# Patient Record
Sex: MALE | Race: WHITE | Employment: FULL TIME | URBAN - METROPOLITAN AREA
[De-identification: names, ages, dates, MRNs, and addresses within clinical notes are randomized per-mention and may not be internally consistent; named-entity substitution may affect disease eponyms.]

---

## 2017-10-27 ENCOUNTER — TELEPHONE (OUTPATIENT)
Dept: INFECTIOUS DISEASES | Age: 72
End: 2017-10-27

## 2017-10-27 RX ORDER — DOXYCYCLINE HYCLATE 100 MG
100 TABLET ORAL 2 TIMES DAILY
Qty: 60 TABLET | Refills: 5 | Status: SHIPPED | OUTPATIENT
Start: 2017-10-27 | End: 2017-11-24 | Stop reason: SDUPTHER

## 2017-11-24 RX ORDER — DOXYCYCLINE HYCLATE 100 MG
TABLET ORAL
Qty: 60 TABLET | Refills: 10 | Status: SHIPPED | OUTPATIENT
Start: 2017-11-24 | End: 2017-11-30 | Stop reason: SDUPTHER

## 2017-11-30 RX ORDER — DOXYCYCLINE HYCLATE 100 MG
TABLET ORAL
Qty: 60 TABLET | Refills: 10 | Status: SHIPPED | OUTPATIENT
Start: 2017-11-30 | End: 2018-11-02 | Stop reason: SDUPTHER

## 2018-11-02 RX ORDER — DOXYCYCLINE 100 MG/1
100 CAPSULE ORAL 2 TIMES DAILY
Qty: 60 CAPSULE | Refills: 5 | Status: SHIPPED | OUTPATIENT
Start: 2018-11-02 | End: 2018-12-03 | Stop reason: SDUPTHER

## 2018-11-02 RX ORDER — DOXYCYCLINE HYCLATE 100 MG
TABLET ORAL
Qty: 60 TABLET | Refills: 9 | Status: CANCELLED | OUTPATIENT
Start: 2018-11-02

## 2018-12-03 RX ORDER — DOXYCYCLINE HYCLATE 100 MG
TABLET ORAL
Qty: 60 TABLET | Refills: 9 | Status: SHIPPED | OUTPATIENT
Start: 2018-12-03

## 2019-08-15 ENCOUNTER — TELEPHONE (OUTPATIENT)
Dept: INFECTIOUS DISEASES | Age: 74
End: 2019-08-15

## 2019-08-15 NOTE — TELEPHONE ENCOUNTER
Pt called and wants to know if he can stop taking the Doxycyline. Reports he's been on it for 5/6 years and it's just a nuisance. Asked if there was anyway he can get a test to see if he has mrsa.

## 2019-08-15 NOTE — TELEPHONE ENCOUNTER
Pt called to set up appointment. Said he was going to meet Dr. Ant Holt and it should be at [facility]. Just wanted to verify where I should set the patient up at.  Thanks

## 2019-11-04 ENCOUNTER — OFFICE VISIT (OUTPATIENT)
Dept: INFECTIOUS DISEASES | Age: 74
End: 2019-11-04
Payer: MEDICARE

## 2019-11-04 VITALS
SYSTOLIC BLOOD PRESSURE: 130 MMHG | HEIGHT: 72 IN | DIASTOLIC BLOOD PRESSURE: 58 MMHG | TEMPERATURE: 97.7 F | WEIGHT: 207 LBS | BODY MASS INDEX: 28.04 KG/M2

## 2019-11-04 DIAGNOSIS — T84.59XD INFECTION OF PROSTHETIC KNEE JOINT, SUBSEQUENT ENCOUNTER: Primary | ICD-10-CM

## 2019-11-04 DIAGNOSIS — Z96.659 INFECTION OF PROSTHETIC KNEE JOINT, SUBSEQUENT ENCOUNTER: Primary | ICD-10-CM

## 2019-11-04 PROCEDURE — 99213 OFFICE O/P EST LOW 20 MIN: CPT | Performed by: INTERNAL MEDICINE

## 2020-10-08 ENCOUNTER — OFFICE VISIT (OUTPATIENT)
Dept: INFECTIOUS DISEASES | Age: 75
End: 2020-10-08
Payer: MEDICARE

## 2020-10-08 VITALS
HEART RATE: 60 BPM | DIASTOLIC BLOOD PRESSURE: 68 MMHG | OXYGEN SATURATION: 97 % | TEMPERATURE: 97.5 F | SYSTOLIC BLOOD PRESSURE: 118 MMHG

## 2020-10-08 PROCEDURE — 4040F PNEUMOC VAC/ADMIN/RCVD: CPT | Performed by: INTERNAL MEDICINE

## 2020-10-08 PROCEDURE — 1123F ACP DISCUSS/DSCN MKR DOCD: CPT | Performed by: INTERNAL MEDICINE

## 2020-10-08 PROCEDURE — 1036F TOBACCO NON-USER: CPT | Performed by: INTERNAL MEDICINE

## 2020-10-08 PROCEDURE — G8417 CALC BMI ABV UP PARAM F/U: HCPCS | Performed by: INTERNAL MEDICINE

## 2020-10-08 PROCEDURE — G8427 DOCREV CUR MEDS BY ELIG CLIN: HCPCS | Performed by: INTERNAL MEDICINE

## 2020-10-08 PROCEDURE — G8484 FLU IMMUNIZE NO ADMIN: HCPCS | Performed by: INTERNAL MEDICINE

## 2020-10-08 PROCEDURE — 99214 OFFICE O/P EST MOD 30 MIN: CPT | Performed by: INTERNAL MEDICINE

## 2020-10-08 PROCEDURE — 3017F COLORECTAL CA SCREEN DOC REV: CPT | Performed by: INTERNAL MEDICINE

## 2020-10-08 RX ORDER — OMEPRAZOLE 40 MG/1
40 CAPSULE, DELAYED RELEASE ORAL DAILY
COMMUNITY

## 2020-10-08 RX ORDER — MELOXICAM 15 MG/1
15 TABLET ORAL DAILY
COMMUNITY
Start: 2020-08-01

## 2020-10-08 NOTE — PROGRESS NOTES
Infectious Diseases Consult Note    Reason for Consult:   L TKA infection  Requesting Physician:    [facility]  Primary Care Physician:  Nayeli Lassiter  History Obtained From:   Patient, EPIC    CHIEF COMPLAINT:      Chief Complaint   Patient presents with    Follow-up     Was seeing  [facility] for a L. knee prosthetic. taking doxycyline 100 mg daily, no issues or complaints there. Is interested in DC doxycycline, wants to be sure that MRSA is not dormant. 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   L knee 'aches', mild pain (estimate '2 out of 10')  No complaint       Past Medical History:    Past Medical History:   Diagnosis Date    Arthritis     Hyperlipidemia     MRSA (methicillin resistant staph aureus) culture positive 11/26/15    BC +    MRSA (methicillin resistant staph aureus) culture positive 11/26/15    left knee    Personality, type A     takes Lexapro    Vision impairment     glasses       Past Surgical History:    Past Surgical History:   Procedure Laterality Date    APPENDECTOMY  1962    ELBOW SURGERY Left 2009    JOINT REPLACEMENT Left 10/27/2015    LEFT TOTAL KNEE REPLACEMENT WITH CELLSAVER AND PLATELET GEL,    KNEE ARTHROSCOPY Left 2010    x2    KNEE ARTHROSCOPY Left 12/12/2013    KNEE SURGERY      OTHER SURGICAL HISTORY Left 11/27/2015    left knee incision and drainage    OTHER SURGICAL HISTORY  11/30/2015    INCISION AND DRAINAGE OF LEFT KNEE WITH POLY EXCHANGE    SHOULDER ARTHROSCOPY Right 3/11/2015    TONSILLECTOMY  1958       Current Medications:    Current Outpatient Medications   Medication Sig Dispense Refill    meloxicam (MOBIC) 15 MG tablet Take 15 mg by mouth daily      omeprazole (PRILOSEC) 40 MG delayed release capsule Take 40 mg by mouth daily      doxycycline hyclate (VIBRA-TABS) 100 MG tablet TAKE ONE TABLET BY MOUTH TWICE A DAY 60 tablet 9    rosuvastatin (CRESTOR) 40 MG tablet Take 40 mg by mouth daily.  Omega-3 Fatty Acids (FISH OIL) 1000 MG CAPS Take 3,000 mg by mouth 3 times daily      Multiple Vitamins-Minerals (THERAPEUTIC MULTIVITAMIN-MINERALS) tablet Take 1 tablet by mouth daily       No current facility-administered medications for this visit. Allergies:  Patient has no known allergies. Social History:    TOBACCO:   None    ETOH:    Occasional - 3 times a week, 'couple of beer'   DRUGS:    None   MARITAL STATUS:        OCCUPATION:         Part time -     Family History:   No immunodeficiency     REVIEW OF SYSTEMS:    No fever / chills / sweats. No weight loss. No visual change, eye pain, eye discharge. No oral lesion, sore throat, dysphagia. Denies cough / sputum. Denies chest pain, palpitations. Denies n / v / abd pain. No diarrhea. Denies dysuria or change in urinary function. Denies joint swelling or pain. No myalgia, arthralgia. Denies skin changes, itching  Denies new / worse depression, psychiatric symptoms  Denies focal weakness, sensory change or other neurologic symptom  No symptoms endocrinopathy. No symptoms hematologic disease. PHYSICAL EXAM:    Vitals:  See intake vitals including weight    GENERAL: No apparent distress.     HEENT: Membranes moist, no oral lesion  NECK:  Supple  LYMPH: No adenopathy   LUNGS: Clear b/l, no rales, no dullness  CARDIAC: RRR, no murmur appreciated  ABD:  + BS, soft / NT  EXT:  No rash, no edema, no lesions - L knee with swelling, scar healed, no redness / warmth  NEURO: No focal neurologic findings  PSYCH: Orientation, sensorium, mood normal  Wound:      DATA:    See Epic    11/26/15 R knee: heavy growth MRSA  MRSA ANTIBIOTICS JOHNATHAN Interp   _____________________________________________________________________  Cefazolin >16 R   Ciprofloxacin >2 R   Clindamycin <=0.5 S   Erythromycin >4 R   Oxacillin >2 R   Tetracycline <=4 S   Trimethoprim/Sulfamethoxazole 1/19 S   Vancomycin 2 S     IMPRESSION    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 - Over 50% of time spent in pt counseling and education.     Lit Hough MD